# Patient Record
Sex: FEMALE | Race: WHITE | NOT HISPANIC OR LATINO | Employment: FULL TIME | URBAN - METROPOLITAN AREA
[De-identification: names, ages, dates, MRNs, and addresses within clinical notes are randomized per-mention and may not be internally consistent; named-entity substitution may affect disease eponyms.]

---

## 2017-12-11 ENCOUNTER — APPOINTMENT (OUTPATIENT)
Dept: WOMENS IMAGING | Facility: HOSPITAL | Age: 42
End: 2017-12-11

## 2017-12-11 PROCEDURE — 77067 SCR MAMMO BI INCL CAD: CPT | Performed by: RADIOLOGY

## 2017-12-28 ENCOUNTER — APPOINTMENT (OUTPATIENT)
Dept: WOMENS IMAGING | Facility: HOSPITAL | Age: 42
End: 2017-12-28

## 2017-12-28 PROCEDURE — 76641 ULTRASOUND BREAST COMPLETE: CPT | Performed by: RADIOLOGY

## 2017-12-28 PROCEDURE — G0204 DX MAMMO INCL CAD BI: HCPCS | Performed by: RADIOLOGY

## 2017-12-28 PROCEDURE — 77066 DX MAMMO INCL CAD BI: CPT | Performed by: RADIOLOGY

## 2019-01-02 ENCOUNTER — APPOINTMENT (OUTPATIENT)
Dept: WOMENS IMAGING | Facility: HOSPITAL | Age: 44
End: 2019-01-02

## 2019-01-02 PROCEDURE — 77067 SCR MAMMO BI INCL CAD: CPT | Performed by: RADIOLOGY

## 2019-01-02 PROCEDURE — 77063 BREAST TOMOSYNTHESIS BI: CPT | Performed by: RADIOLOGY

## 2020-01-13 ENCOUNTER — APPOINTMENT (OUTPATIENT)
Dept: WOMENS IMAGING | Facility: HOSPITAL | Age: 45
End: 2020-01-13

## 2020-01-13 PROCEDURE — 77067 SCR MAMMO BI INCL CAD: CPT | Performed by: RADIOLOGY

## 2020-01-13 PROCEDURE — 77063 BREAST TOMOSYNTHESIS BI: CPT | Performed by: RADIOLOGY

## 2020-07-28 ENCOUNTER — OFFICE VISIT (OUTPATIENT)
Dept: ORTHOPEDIC SURGERY | Facility: CLINIC | Age: 45
End: 2020-07-28

## 2020-07-28 VITALS
WEIGHT: 143 LBS | DIASTOLIC BLOOD PRESSURE: 68 MMHG | HEIGHT: 70 IN | SYSTOLIC BLOOD PRESSURE: 110 MMHG | HEART RATE: 71 BPM | BODY MASS INDEX: 20.47 KG/M2

## 2020-07-28 DIAGNOSIS — R52 PAIN: Primary | ICD-10-CM

## 2020-07-28 DIAGNOSIS — M67.432 GANGLION CYST OF VOLAR ASPECT OF LEFT WRIST: ICD-10-CM

## 2020-07-28 PROCEDURE — 20605 DRAIN/INJ JOINT/BURSA W/O US: CPT | Performed by: ORTHOPAEDIC SURGERY

## 2020-07-28 PROCEDURE — 73110 X-RAY EXAM OF WRIST: CPT | Performed by: ORTHOPAEDIC SURGERY

## 2020-07-28 PROCEDURE — 99203 OFFICE O/P NEW LOW 30 MIN: CPT | Performed by: ORTHOPAEDIC SURGERY

## 2020-07-28 RX ORDER — TRIAMCINOLONE ACETONIDE 40 MG/ML
40 INJECTION, SUSPENSION INTRA-ARTICULAR; INTRAMUSCULAR
Status: COMPLETED | OUTPATIENT
Start: 2020-07-28 | End: 2020-07-28

## 2020-07-28 RX ORDER — DILTIAZEM HYDROCHLORIDE 180 MG/1
180 CAPSULE, COATED, EXTENDED RELEASE ORAL DAILY
COMMUNITY

## 2020-07-28 RX ORDER — LIDOCAINE HYDROCHLORIDE 10 MG/ML
1 INJECTION, SOLUTION EPIDURAL; INFILTRATION; INTRACAUDAL; PERINEURAL
Status: COMPLETED | OUTPATIENT
Start: 2020-07-28 | End: 2020-07-28

## 2020-07-28 RX ORDER — TRAZODONE HYDROCHLORIDE 50 MG/1
50 TABLET ORAL
COMMUNITY
End: 2020-11-12 | Stop reason: DRUGHIGH

## 2020-07-28 RX ADMIN — LIDOCAINE HYDROCHLORIDE 1 ML: 10 INJECTION, SOLUTION EPIDURAL; INFILTRATION; INTRACAUDAL; PERINEURAL at 10:15

## 2020-07-28 RX ADMIN — TRIAMCINOLONE ACETONIDE 40 MG: 40 INJECTION, SUSPENSION INTRA-ARTICULAR; INTRAMUSCULAR at 10:15

## 2020-07-28 NOTE — PROGRESS NOTES
Subjective:     Patient ID: Colette Moreno is a 45 y.o. female.    Chief Complaint:  Left wrist pain and swelling  History of Present Illness  Colette Moreno presents to clinic today for evaluation of left wrist swelling and pain, area of swelling has been noted since approximately December, no injury prior to the onset of her symptoms.  Rates her associated pain is a 3 out of 10, exacerbated with local pressure or when typing and putting pressure through the volar aspect of her wrist, she does note some intermittent tingling shooting down into her hand with associated sharp pain with pressure or contact.  Minimal improvement with soft tissue massage and rest.  She is right-hand dominant.  Denies radiation of her pain but does note some radiating tingling as noted above.     Social History     Occupational History   • Not on file   Tobacco Use   • Smoking status: Never Smoker   • Smokeless tobacco: Never Used   Substance and Sexual Activity   • Alcohol use: Yes   • Drug use: Never   • Sexual activity: Defer      Past Medical History:   Diagnosis Date   • Cardiac arrhythmia due to congenital heart disease    • Fracture of wrist     R   • Insomnia    • Tear of meniscus of knee      Past Surgical History:   Procedure Laterality Date   • CHOLECYSTECTOMY  12/2016   • KNEE SURGERY Left 12/2007       Family History   Problem Relation Age of Onset   • Cancer Maternal Grandfather    • Hypertension Father    • Hypertension Brother    • Hypertension Paternal Aunt    • Heart disease Paternal Aunt    • Hypertension Paternal Uncle    • Heart disease Paternal Uncle    • Hypertension Paternal Grandmother    • Heart disease Paternal Grandmother    • Hypertension Paternal Grandfather    • Heart disease Paternal Grandfather          Review of Systems   Constitutional: Negative for chills, diaphoresis, fever and unexpected weight change.   HENT: Negative for hearing loss, nosebleeds, sore throat and tinnitus.    Eyes:  "Negative for pain and visual disturbance.   Respiratory: Negative for cough, shortness of breath and wheezing.    Cardiovascular: Positive for palpitations. Negative for chest pain.   Gastrointestinal: Negative for abdominal pain, diarrhea, nausea and vomiting.   Endocrine: Negative for cold intolerance, heat intolerance and polydipsia.   Genitourinary: Negative for difficulty urinating, dysuria and hematuria.   Musculoskeletal: Positive for joint swelling. Negative for arthralgias and myalgias.   Skin: Negative for rash and wound.   Allergic/Immunologic: Positive for environmental allergies.   Neurological: Negative for dizziness, syncope and numbness.   Hematological: Bruises/bleeds easily.   Psychiatric/Behavioral: Positive for sleep disturbance. Negative for dysphoric mood. The patient is not nervous/anxious.            Objective:  Vitals:    07/28/20 0922   BP: 110/68   BP Location: Right arm   Pulse: 71   Weight: 64.9 kg (143 lb)   Height: 177.8 cm (70\")         07/28/20 0922   Weight: 64.9 kg (143 lb)     Body mass index is 20.52 kg/m².  Physical Exam    Vital signs reviewed.   General: No acute distress, alert and oriented  Eyes: conjunctiva clear; pupils equally round and reactive  ENT: external ears and nose atraumatic; oropharynx clear  CV: no peripheral edema  Resp: normal respiratory effort  Skin: no rashes or wounds; normal turgor  Psych: mood and affect appropriate; recent and remote memory intact          Ortho Exam     Left wrist-small 2 cm volar mass adjacent to FCR tendon moderate tenderness with deep palpation noted, it is mobile, firm, no overlying skin changes.  Full flexion-extension left wrist symmetric to the right with 5-5 strength, negative Gates shift test, no tenderness over anatomic snuffbox.  Full flexion extension all digits of left hand at MCP and IP joints with 5-5 strength, positive thumbs up, okay sign, cross finger abduction and adduction test against resistance.  Positive " sensation light touch all distributions left hand symmetric to right, brisk cap refill all digits, 2+ radial pulse left wrist.    Imaging:  Left Wrist X-Ray  Indication: Pain  AP, Lateral, and Oblique views    Findings:  No fracture  No bony lesion  Normal soft tissues  Normal joint spaces    No prior studies were available for comparison.    Assessment:        1. Pain    2. Ganglion cyst of volar aspect of left wrist           Plan:  Medium Joint Arthrocentesis  Date/Time: 7/28/2020 10:15 AM  Consent given by: patient  Site marked: site marked  Timeout: Immediately prior to procedure a time out was called to verify the correct patient, procedure, equipment, support staff and site/side marked as required   Supporting Documentation  Indications: pain   Procedure Details  Location: wrist - Wrist joint: LEFT WRIST.  Preparation: Patient was prepped and draped in the usual sterile fashion  Needle gauge: 20G ASPIRATION 22G INJECTION.  Approach: volar  Medications administered: 1 mL lidocaine PF 1% 1 %; 40 mg triamcinolone acetonide 40 MG/ML  Aspirate amount: 1 mL  Aspirate characteristics: JELLY-CYST ASPIRATION.  Patient tolerance: patient tolerated the procedure well with no immediate complications                1. Discussed treatment options at length with patient at today's visit.  Patient wished to proceed with aspiration and injection of the left wrist ganglion cyst.  Also recommended immobilization with a brace.  Follow-up in 4 weeks and if still having issues at that time may consider excision versus referral to hand surgeon.      Colettena Moreno was in agreement with plan and had all questions answered.     Orders:  Orders Placed This Encounter   Procedures   • Medium Joint Arthrocentesis   • XR Wrist 3+ View Left       Medications:  No orders of the defined types were placed in this encounter.      Followup:  Return in about 4 weeks (around 8/25/2020).    Colette was seen today for pain.    Diagnoses and all  orders for this visit:    Pain  -     XR Wrist 3+ View Left    Ganglion cyst of volar aspect of left wrist    Other orders  -     Medium Joint Arthrocentesis          Dictated utilizing Dragon dictation

## 2020-11-10 NOTE — PROGRESS NOTES
Subjective:     Patient ID: Colette Moreno is a 45 y.o. female.    Chief Complaint: left knee pain, new issue  Follow-up left wrist volar ganglion cyst  Last injection left wrist 07/28/2020    History of Present Illness  Colette Moreno presents to clinic today for evaluation of her left wrist and left knee. She states that about two weeks ago, the ganglion cyst began to reoccur after it had previously improved with injection. She has had increased pain and swelling in the wrist since that time, as well as intermittent numbness and tingling. She rates the pain as 3/10, describes it as aching in nature. Localizes pain to thenar eminence of the left hand. Has noted improvement with wearing a brace. Symptoms are exacerbated with gripping and lifting. Denies radiation of pain.    Patient also complains of left knee pain today. She states that she had a meniscal tear about 13 years ago, which was repaired. Her pain began to reoccur about 2 months ago, with no new injury at that time. She also notes weakness and some swelling, and occasional catching in the knee. She rates the pain as 4/10, describes it as aching, sharp, and shooting in nature. Localizes pain to the medial aspect of the knee.  Has noted improvement with activity modification. Symptoms are exacerbated with rotation and flexion. Denies radiation of pain, denies associated numbness or tingling.         Social History     Occupational History   • Not on file   Tobacco Use   • Smoking status: Never Smoker   • Smokeless tobacco: Never Used   Substance and Sexual Activity   • Alcohol use: Yes   • Drug use: Never   • Sexual activity: Defer      Past Medical History:   Diagnosis Date   • Cardiac arrhythmia due to congenital heart disease    • Fracture of wrist     R   • Insomnia    • Tear of meniscus of knee      Past Surgical History:   Procedure Laterality Date   • CHOLECYSTECTOMY  12/2016   • KNEE SURGERY Left 12/2007       Family History   Problem  "Relation Age of Onset   • Cancer Maternal Grandfather    • Hypertension Father    • Hypertension Brother    • Hypertension Paternal Aunt    • Heart disease Paternal Aunt    • Hypertension Paternal Uncle    • Heart disease Paternal Uncle    • Hypertension Paternal Grandmother    • Heart disease Paternal Grandmother    • Hypertension Paternal Grandfather    • Heart disease Paternal Grandfather          Review of Systems   Constitutional: Negative for chills, diaphoresis and unexpected weight change.   HENT: Negative for hearing loss, nosebleeds, sore throat and tinnitus.    Eyes: Negative for pain and visual disturbance.   Respiratory: Negative for cough, shortness of breath and wheezing.    Cardiovascular: Negative for chest pain and palpitations.   Gastrointestinal: Negative for abdominal pain, diarrhea, nausea and vomiting.   Endocrine: Negative for cold intolerance, heat intolerance and polydipsia.   Genitourinary: Negative for difficulty urinating, dyspareunia and hematuria.   Musculoskeletal: Positive for arthralgias and myalgias.   Skin: Negative for rash and wound.   Allergic/Immunologic: Negative for environmental allergies.   Neurological: Negative for dizziness, syncope and numbness.   Hematological: Does not bruise/bleed easily.   Psychiatric/Behavioral: Negative for dysphoric mood and sleep disturbance. The patient is not nervous/anxious.            Objective:  Vitals:    11/12/20 0849   BP: 105/71   Pulse: 61   Weight: 64.9 kg (143 lb)   Height: 177.8 cm (70\")         11/12/20  0849   Weight: 64.9 kg (143 lb)     Body mass index is 20.52 kg/m².    Physical Exam    Vital signs reviewed.   General: No acute distress, alert and oriented  Eyes: conjunctiva clear; pupils equally round and reactive  ENT: external ears and nose atraumatic; oropharynx clear  CV: no peripheral edema  Resp: normal respiratory effort  Skin: no rashes or wounds; normal turgor  Psych: mood and affect appropriate; recent and remote " memory intact        Ortho Exam       Left Knee-    ROM 0-135 degrees  4+/5 on flexion  4+/5 on extension  Positive Emigdio exam medial joint line    Grade 1A [place]  Anterior drawer- negative  Posterior drawer- negative   No opening on varus and valgus stress at 0 and 30    Log roll-  negative  Stinchfield-  negative    Positive sensation light tough all distributions symmetric to contralateral side  Brisk cap refill all digits  2+ dorsalis pedis pulse    Left Wrist-  Moderate tenderness over volar wrist   Pain on resisted wrist flexion    Minimal swelling over previous volar cyst   Maximal pain with thumb flexion over FPL tendon  Supination and pronation - 90 degrees, 4+/5 strength  Full radial and ulnar deviation  Mildly positive basilar thumb grind test  Positive sensation anatomic snuffbox   Positive sensation light touch all distributions  Brisk cap refill, 2+ radial pulse        Imaging:  Left Knee X-Ray  Indication: Pain    AP, Lateral, and [place] views    Findings:  No fracture  No bony lesion  Normal soft tissues  Mild medial compartment joint space narrowing    No prior studies were available for comparison.    Assessment:        1. Left knee pain, unspecified chronicity    2. Ganglion cyst of volar aspect of left wrist    3. Mechanical knee pain, left           Plan:          1. Discussed treatment options at length with patient at today's visit including referral to hand surgery vs occupational therapy vs oral steroid or antiinflammatory and bracing for wrist, and MRI vs cortisone injection for the knee. Patient would like to proceed with antiinflammatory and MRI.   2. Prescribed Meloxicam 7.5 mg daily for inflammation  3. Provided samples of Pennsaid today.  4. Continue to wear wrist brace for activities as needed.  5. Ordered MRI of the left knee to evaluate for meniscal pathology.  6. Follow-up with me for review of MRI results. Will consider referral to hand surgery regarding left wrist if  symptoms have not improved.       Colette DAPHNIE Moreno was in agreement with plan and had all questions answered.     Orders:  Orders Placed This Encounter   Procedures   • XR Knee 3+ View With [place] Left       Medications:  New Medications Ordered This Visit   Medications   • meloxicam (MOBIC) 7.5 MG tablet     Sig: Take 1 tablet by mouth Daily.     Dispense:  30 tablet     Refill:  0       Followup:  Return for review of MRI results.    Diagnoses and all orders for this visit:    1. Left knee pain, unspecified chronicity (Primary)  -     XR Knee 3+ View With [place] Left    2. Ganglion cyst of volar aspect of left wrist    3. Mechanical knee pain, left    Other orders  -     meloxicam (MOBIC) 7.5 MG tablet; Take 1 tablet by mouth Daily.  Dispense: 30 tablet; Refill: 0           By signing my name here, I Juanis Escobar attest that all documentation on 11/12/20 at 09:40 EST has been prepared under the direction and in the presence of Dr. Jung Arthur MD.    I, Dr. Jung Arthur, personally performed the services described in this documentation, as scribed by Juanis Escobar, in my presence, and it is both accurate and complete.        Dictated utilizing Dragon dictation

## 2020-11-12 ENCOUNTER — OFFICE VISIT (OUTPATIENT)
Dept: ORTHOPEDIC SURGERY | Facility: CLINIC | Age: 45
End: 2020-11-12

## 2020-11-12 VITALS
DIASTOLIC BLOOD PRESSURE: 71 MMHG | SYSTOLIC BLOOD PRESSURE: 105 MMHG | WEIGHT: 143 LBS | HEIGHT: 70 IN | BODY MASS INDEX: 20.47 KG/M2 | HEART RATE: 61 BPM

## 2020-11-12 DIAGNOSIS — M67.432 GANGLION CYST OF VOLAR ASPECT OF LEFT WRIST: ICD-10-CM

## 2020-11-12 DIAGNOSIS — M25.562 LEFT KNEE PAIN, UNSPECIFIED CHRONICITY: Primary | ICD-10-CM

## 2020-11-12 DIAGNOSIS — M18.12 PRIMARY OSTEOARTHRITIS OF FIRST CARPOMETACARPAL JOINT OF LEFT HAND: ICD-10-CM

## 2020-11-12 DIAGNOSIS — M25.562 MECHANICAL KNEE PAIN, LEFT: ICD-10-CM

## 2020-11-12 PROCEDURE — 73562 X-RAY EXAM OF KNEE 3: CPT | Performed by: ORTHOPAEDIC SURGERY

## 2020-11-12 PROCEDURE — 99214 OFFICE O/P EST MOD 30 MIN: CPT | Performed by: ORTHOPAEDIC SURGERY

## 2020-11-12 RX ORDER — TRAZODONE HYDROCHLORIDE 100 MG/1
TABLET ORAL
COMMUNITY
Start: 2020-10-27

## 2020-11-12 RX ORDER — MELOXICAM 7.5 MG/1
7.5 TABLET ORAL DAILY
Qty: 30 TABLET | Refills: 0 | Status: SHIPPED | OUTPATIENT
Start: 2020-11-12 | End: 2023-02-13

## 2020-11-27 ENCOUNTER — HOSPITAL ENCOUNTER (OUTPATIENT)
Dept: MRI IMAGING | Facility: HOSPITAL | Age: 45
Discharge: HOME | End: 2020-11-27
Admitting: ORTHOPAEDIC SURGERY

## 2020-11-27 DIAGNOSIS — M25.562 MECHANICAL KNEE PAIN, LEFT: ICD-10-CM

## 2020-11-27 DIAGNOSIS — M25.562 LEFT KNEE PAIN, UNSPECIFIED CHRONICITY: ICD-10-CM

## 2020-11-27 PROCEDURE — 73721 MRI JNT OF LWR EXTRE W/O DYE: CPT

## 2020-11-30 ENCOUNTER — TELEPHONE (OUTPATIENT)
Dept: ORTHOPEDIC SURGERY | Facility: CLINIC | Age: 45
End: 2020-11-30

## 2020-11-30 NOTE — TELEPHONE ENCOUNTER
FYI: PATIENT CALLED BACK & WAS SCHEDULED TO SEE DR. PRINCE 1ST AVAILABLE 12/22/20 @0920 FOR FOLLOW UP / LEFT KNEE / MRI 11/27/20 ([facility]) TO DISCUSS RESULTS, OPTIONS.     PATIENT MOVING & UNAVAILABLE TILL 12/10/20. PLACED ON WAIT LIST & NEEDS 1 HR'S NOTICE TO TRY & RESCHEDULE IF POSSIBLE.     CALL BACK [phone]     THANKS

## 2020-11-30 NOTE — TELEPHONE ENCOUNTER
----- Message from Jung Arthur MD sent at 11/30/2020 11:42 AM EST -----  Please schedule patient for follow up to review study results

## 2020-11-30 NOTE — TELEPHONE ENCOUNTER
Patient to call and make appointment when she has her appointment book in hand. Needs follow up appointment for MRI results with dr. Arthur.

## 2020-12-08 ENCOUNTER — TELEPHONE (OUTPATIENT)
Dept: ORTHOPEDIC SURGERY | Facility: CLINIC | Age: 45
End: 2020-12-08

## 2020-12-08 NOTE — TELEPHONE ENCOUNTER
Apt  Is set for12/22, wanted you to know her knee now has completely given out on her.  She cant bear wt.on it, cant go up or down steps, cant walk on uneven surfaces.  It is very very painful, swollen and really cant squat or bend.  She feels this is very serious and cant walk and get a round and wants to be seen sooner than 12/22

## 2020-12-11 ENCOUNTER — OFFICE VISIT (OUTPATIENT)
Dept: ORTHOPEDIC SURGERY | Facility: CLINIC | Age: 45
End: 2020-12-11

## 2020-12-11 VITALS — WEIGHT: 143 LBS | BODY MASS INDEX: 20.47 KG/M2 | HEIGHT: 70 IN

## 2020-12-11 DIAGNOSIS — M17.12 PRIMARY OSTEOARTHRITIS OF LEFT KNEE: Primary | ICD-10-CM

## 2020-12-11 DIAGNOSIS — M23.204 OLD COMPLEX TEAR OF MEDIAL MENISCUS OF LEFT KNEE: ICD-10-CM

## 2020-12-11 PROBLEM — G43.909 MIGRAINE HEADACHE: Status: ACTIVE | Noted: 2020-12-11

## 2020-12-11 PROBLEM — Z86.79 H/O SINUS TACHYCARDIA: Status: ACTIVE | Noted: 2020-12-11

## 2020-12-11 PROBLEM — G45.0 VERTEBROBASILAR ARTERY INSUFFICIENCY: Status: ACTIVE | Noted: 2018-08-10

## 2020-12-11 PROCEDURE — 99214 OFFICE O/P EST MOD 30 MIN: CPT | Performed by: ORTHOPAEDIC SURGERY

## 2020-12-11 PROCEDURE — 20610 DRAIN/INJ JOINT/BURSA W/O US: CPT | Performed by: ORTHOPAEDIC SURGERY

## 2020-12-11 RX ORDER — TRIAMCINOLONE ACETONIDE 40 MG/ML
80 INJECTION, SUSPENSION INTRA-ARTICULAR; INTRAMUSCULAR
Status: COMPLETED | OUTPATIENT
Start: 2020-12-11 | End: 2020-12-11

## 2020-12-11 RX ORDER — LIDOCAINE HYDROCHLORIDE 10 MG/ML
8 INJECTION, SOLUTION EPIDURAL; INFILTRATION; INTRACAUDAL; PERINEURAL
Status: COMPLETED | OUTPATIENT
Start: 2020-12-11 | End: 2020-12-11

## 2020-12-11 RX ORDER — DILTIAZEM HYDROCHLORIDE 180 MG/1
CAPSULE, EXTENDED RELEASE ORAL
COMMUNITY
Start: 2020-10-14

## 2020-12-11 RX ADMIN — TRIAMCINOLONE ACETONIDE 80 MG: 40 INJECTION, SUSPENSION INTRA-ARTICULAR; INTRAMUSCULAR at 08:31

## 2020-12-11 RX ADMIN — LIDOCAINE HYDROCHLORIDE 8 ML: 10 INJECTION, SOLUTION EPIDURAL; INFILTRATION; INTRACAUDAL; PERINEURAL at 08:31

## 2020-12-11 NOTE — PROGRESS NOTES
Subjective:     Patient ID: Colette Moreno is a 45 y.o. female.    Chief Complaint: follow-up left knee pain  MRI results    History of Present Illness  Colette Moreno returns to clinic today for evaluation of her left knee. She complains of continued pain and stiffness in the left knee today, as well as frequent popping. She states that the pain has improved over the past two days compared to previously, but the stiffness is unchanged. She also notes an incident in which her knee gave way 2 days ago. She rates the pain as 4/10, describes it as aching, sharp, and shooting in nature. Localizes pain to the medial aspect of the knee, with radiation into the leg. Has noted improvement with activity modification. Symptoms are exacerbated with walking up or down inclines or stairs. Denies associated numbness or tingling.       Social History     Occupational History   • Not on file   Tobacco Use   • Smoking status: Never Smoker   • Smokeless tobacco: Never Used   Substance and Sexual Activity   • Alcohol use: Yes   • Drug use: Never   • Sexual activity: Defer      Past Medical History:   Diagnosis Date   • Cardiac arrhythmia due to congenital heart disease    • Fracture of wrist     R   • Insomnia    • Tear of meniscus of knee      Past Surgical History:   Procedure Laterality Date   • CHOLECYSTECTOMY  12/2016   • KNEE SURGERY Left 12/2007       Family History   Problem Relation Age of Onset   • Cancer Maternal Grandfather    • Hypertension Father    • Hypertension Brother    • Hypertension Paternal Aunt    • Heart disease Paternal Aunt    • Hypertension Paternal Uncle    • Heart disease Paternal Uncle    • Hypertension Paternal Grandmother    • Heart disease Paternal Grandmother    • Hypertension Paternal Grandfather    • Heart disease Paternal Grandfather          Review of Systems   Constitutional: Negative for chills, diaphoresis and unexpected weight change.   HENT: Negative for hearing loss, nosebleeds,  "sore throat and tinnitus.    Eyes: Negative for pain and visual disturbance.   Respiratory: Negative for cough, shortness of breath and wheezing.    Cardiovascular: Negative for chest pain and palpitations.   Gastrointestinal: Negative for abdominal pain, diarrhea, nausea and vomiting.   Endocrine: Negative for cold intolerance, heat intolerance and polydipsia.   Genitourinary: Negative for difficulty urinating, dyspareunia and hematuria.   Musculoskeletal: Positive for arthralgias and myalgias.   Skin: Negative for rash and wound.   Allergic/Immunologic: Negative for environmental allergies.   Neurological: Negative for dizziness, syncope and numbness.   Hematological: Does not bruise/bleed easily.   Psychiatric/Behavioral: Negative for dysphoric mood and sleep disturbance. The patient is not nervous/anxious.            Objective:  Vitals:    12/11/20 0750   Weight: 64.9 kg (143 lb)   Height: 177.8 cm (70\")         12/11/20 0750   Weight: 64.9 kg (143 lb)     Body mass index is 20.52 kg/m².     Physical Exam    Vital signs reviewed.   General: No acute distress, alert and oriented  Eyes: conjunctiva clear; pupils equally round and reactive  ENT: external ears and nose atraumatic; oropharynx clear  CV: no peripheral edema  Resp: normal respiratory effort  Skin: no rashes or wounds; normal turgor  Psych: mood and affect appropriate; recent and remote memory intact        Ortho Exam       Left Knee-     ROM 0-130 degrees  4+/5 on flexion  4+/5 on extension  Maximal tenderness medial joint line with positive Emigdio exam  Moderate effusion  No opening on varus and valgus stress at 0 and 30     Log roll-  negative  Stinchfield-  negative  Positive active patella compression test  Positive sensation light tough all distributions symmetric to contralateral side  Brisk cap refill all digits  2+ dorsalis pedis pulse      Imaging:  Mri Knee Left Without Contrast    Result Date: 11/30/2020  Impression: Moderately advanced " medial compartment arthrosis with diffuse full-thickness tear near full-thickness cartilage loss medial femoral condyle and medial tibial plateau as detailed above. No significant subchondral edema. Small amount of subchondral cystic change medial tibial plateau. Mild chondromalacia patella Meniscal volume loss and signal abnormality mid body segment medial meniscus may represent a degenerative longitudinal oblique tear or sequela of partial meniscectomy. Mild medial meniscal extrusion. No sizable tear or displaced meniscal fragment. Small knee effusion. 5 mm structure anterior to the tibial insertion of the ACL may represent focal synovitis (favored) or potentially loose body. Signer Name: KATARINA Cristobal MD  Signed: 11/30/2020 9:05 AM  Workstation Name: [id number]  [facility]    Review of outside MRI left knee including review of image as well as radiology report indicates moderately advanced medial compartment osteoarthritis with meniscal volume loss the medial meniscus consistent with prior partial meniscectomy and longitudinal oblique tear with mild extrusion, no evidence of displaced fragment.  Potential loose body anterior to the tibial insertion of the ACL 5 mm in size.    Assessment:        1. Primary osteoarthritis of left knee    2. Old complex tear of medial meniscus of left knee           Plan:  Large Joint Arthrocentesis: L knee  Date/Time: 12/11/2020 8:31 AM  Consent given by: patient  Site marked: site marked  Timeout: Immediately prior to procedure a time out was called to verify the correct patient, procedure, equipment, support staff and site/side marked as required   Supporting Documentation  Indications: pain   Procedure Details  Location: knee - L knee  Preparation: Patient was prepped and draped in the usual sterile fashion  Needle size: 22 G  Approach: superior (LATERAL)  Medications administered: 8 mL lidocaine PF 1% 1 %; 80 mg triamcinolone acetonide 40  MG/ML  Patient tolerance: patient tolerated the procedure well with no immediate complications                1. Discussed treatment options at length with patient at today's visit, including home-exercises, bracing, supplements, antiinflammatories, and cortisone or viscosupplement injections.  2. Patient would like to proceed with cortisone injection today to the left knee. Recommended limited use of affected extremity for the next 24 hours to only essential activites other than work on general active and passive motion. Recommended supplementing with ice and soft tissue massage. Discussed with patient that they should see results in 5-7 days, if no improvement in 5-6 weeks I have asked them to call the office to review other options. Patient should call office immediately if they notice redness, warmth, fevers, chills, or residual numbness or tingling for greater than 6 hours after injection.   3. Continue to take glucosamine and chondroitin supplement if it provides relief. May also add tumeric.  4. Follow-up with me in 3 months for evaluation of improvement with injection   5. Hip, core, quad exercises given today.      Colette Moreno was in agreement with plan and had all questions answered.     Orders:  Orders Placed This Encounter   Procedures   • Large Joint Arthrocentesis: L knee       Medications:  No orders of the defined types were placed in this encounter.      Followup:  Return in about 3 months (around 3/11/2021).    Diagnoses and all orders for this visit:    1. Primary osteoarthritis of left knee (Primary)    2. Old complex tear of medial meniscus of left knee    Other orders  -     Large Joint Arthrocentesis: L knee           By signing my name here, I Juanis Escobar attest that all documentation on 12/11/20 at 09:04 EST has been prepared under the direction and in the presence of Dr. Jung Arthur MD.    I, Dr. Jung Arthur, personally performed the services described in this  documentation, as scribed by Juanis Escobar, in my presence, and it is both accurate and complete.        Dictated utilizing Dragon dictation

## 2021-03-16 ENCOUNTER — OFFICE VISIT (OUTPATIENT)
Dept: ORTHOPEDIC SURGERY | Facility: CLINIC | Age: 46
End: 2021-03-16

## 2021-03-16 VITALS — HEIGHT: 70 IN | WEIGHT: 143 LBS | BODY MASS INDEX: 20.47 KG/M2

## 2021-03-16 DIAGNOSIS — M17.12 PRIMARY OSTEOARTHRITIS OF LEFT KNEE: Primary | ICD-10-CM

## 2021-03-16 DIAGNOSIS — M23.204 OLD COMPLEX TEAR OF MEDIAL MENISCUS OF LEFT KNEE: ICD-10-CM

## 2021-03-16 PROBLEM — G47.00 INSOMNIA: Status: ACTIVE | Noted: 2018-06-12

## 2021-03-16 PROBLEM — I48.92 ATRIAL FLUTTER (HCC): Status: ACTIVE | Noted: 2018-11-09

## 2021-03-16 PROBLEM — M67.432 GANGLION CYST OF VOLAR ASPECT OF LEFT WRIST: Status: ACTIVE | Noted: 2020-07-28

## 2021-03-16 PROBLEM — E80.6 HYPERBILIRUBINEMIA: Status: ACTIVE | Noted: 2020-07-10

## 2021-03-16 PROBLEM — D84.9 IMMUNODEFICIENCY DISORDER (HCC): Status: ACTIVE | Noted: 2018-06-12

## 2021-03-16 PROBLEM — Z98.890 HISTORY OF CARDIOVASCULAR SURGERY: Status: ACTIVE | Noted: 2018-11-09

## 2021-03-16 PROBLEM — R10.32 LEFT LOWER QUADRANT PAIN: Status: ACTIVE | Noted: 2020-07-13

## 2021-03-16 PROBLEM — G43.109 COMPLICATED MIGRAINE: Status: ACTIVE | Noted: 2019-10-17

## 2021-03-16 PROBLEM — R94.31 SHORTENED PR INTERVAL: Status: ACTIVE | Noted: 2018-06-12

## 2021-03-16 PROCEDURE — 99213 OFFICE O/P EST LOW 20 MIN: CPT | Performed by: ORTHOPAEDIC SURGERY

## 2021-03-16 NOTE — PROGRESS NOTES
Subjective:     Patient ID: Colette Moreno is a 46 y.o. female.    Chief Complaint:  follow-up left knee pain, DJD, old complex tear medial meniscus  Follow-up recent injection left knee 12/11/2020  History of Present Illness  Colette Moreno returns to clinic today for evaluation of left knee, she did get good improvement with injection from last visit with approximately 80% release for pain but has noted some return of pain as well as residual pain primarily on the medial joint line exacerbated with any lateral or rotational activities rates as a 7-8 out of 10 and sharp in nature when it does occur.  She has had one locking episode since time of last visit.  Mild intermittent swelling that does wax and wane.  Denies radiation, denies associated numbness or tingling.     Social History     Occupational History   • Not on file   Tobacco Use   • Smoking status: Never Smoker   • Smokeless tobacco: Never Used   Vaping Use   • Vaping Use: Never used   Substance and Sexual Activity   • Alcohol use: Yes   • Drug use: Never   • Sexual activity: Defer      Past Medical History:   Diagnosis Date   • Cardiac arrhythmia due to congenital heart disease    • Fracture of wrist     R   • Insomnia    • Tear of meniscus of knee      Past Surgical History:   Procedure Laterality Date   • CHOLECYSTECTOMY  12/2016   • KNEE SURGERY Left 12/2007       Family History   Problem Relation Age of Onset   • Cancer Maternal Grandfather    • Hypertension Father    • Hypertension Brother    • Hypertension Paternal Aunt    • Heart disease Paternal Aunt    • Hypertension Paternal Uncle    • Heart disease Paternal Uncle    • Hypertension Paternal Grandmother    • Heart disease Paternal Grandmother    • Hypertension Paternal Grandfather    • Heart disease Paternal Grandfather          Review of Systems   Constitutional: Negative for chills, diaphoresis, fever and unexpected weight change.   HENT: Negative for hearing loss, nosebleeds, sore  "throat and tinnitus.    Eyes: Negative for pain and visual disturbance.   Respiratory: Negative for cough, shortness of breath and wheezing.    Cardiovascular: Negative for chest pain and palpitations.   Gastrointestinal: Negative for abdominal pain, diarrhea, nausea and vomiting.   Endocrine: Negative for cold intolerance, heat intolerance and polydipsia.   Genitourinary: Negative for difficulty urinating, dysuria and hematuria.   Musculoskeletal: Positive for arthralgias and myalgias. Negative for joint swelling.   Skin: Negative for rash and wound.   Allergic/Immunologic: Negative for environmental allergies.   Neurological: Negative for dizziness, syncope and numbness.   Hematological: Does not bruise/bleed easily.   Psychiatric/Behavioral: Negative for dysphoric mood and sleep disturbance. The patient is not nervous/anxious.            Objective:  Vitals:    03/16/21 1557   Weight: 64.9 kg (143 lb)   Height: 177.8 cm (70\")         03/16/21  1557   Weight: 64.9 kg (143 lb)     Body mass index is 20.52 kg/m².  General: No acute distress.  Resp: normal respiratory effort  Skin: no rashes or wounds; normal turgor  Psych: mood and affect appropriate; recent and remote memory intact          Ortho Exam     Left knee-range of motion 0 135 degrees, 4+ out of 5 strength on flexion and extension, moderate residual tenderness along medial joint line with positive Emigdio exam, stable to varus and valgus stress at 0 and 30 degrees, mild effusion noted.  Positive active patella compression test, brisk cap refill all digits, grade 1A Lachman, negative anterior posterior drawer.    Imaging:  none  Assessment:        1. Primary osteoarthritis of left knee    2. Old complex tear of medial meniscus of left knee           Plan:          1. Discussed treatment options at length with patient at today's visit.  Given residual pain as well as failure of long-term improvement with cortisone injection, anti-inflammatory medication, " and continued pain despite home exercise program with good range of motion and strength of her knee, we discussed options the patient wished to proceed with Visco supplement injection to the left knee.  We will submit for insurance authorization in this regard.  Recommended continue conservative treatment with glucosamine and chondroitin as well as home exercise program and knee sleeve intermittently.      Colette Moreno was in agreement with plan and had all questions answered.     Orders:  Orders Placed This Encounter   Procedures   • Visco Treatment       Medications:  No orders of the defined types were placed in this encounter.      Followup:  Return for viscosupplement injections.    Diagnoses and all orders for this visit:    1. Primary osteoarthritis of left knee (Primary)  -     Visco Treatment; Future    2. Old complex tear of medial meniscus of left knee  -     Visco Treatment; Future          Dictated utilizing Dragon dictation

## 2021-05-04 ENCOUNTER — CLINICAL SUPPORT (OUTPATIENT)
Dept: ORTHOPEDIC SURGERY | Facility: CLINIC | Age: 46
End: 2021-05-04

## 2021-05-04 DIAGNOSIS — M23.204 OLD COMPLEX TEAR OF MEDIAL MENISCUS OF LEFT KNEE: ICD-10-CM

## 2021-05-04 DIAGNOSIS — M17.12 PRIMARY OSTEOARTHRITIS OF LEFT KNEE: Primary | ICD-10-CM

## 2021-05-04 PROCEDURE — 20610 DRAIN/INJ JOINT/BURSA W/O US: CPT | Performed by: ORTHOPAEDIC SURGERY

## 2021-05-04 RX ORDER — NITROFURANTOIN 25; 75 MG/1; MG/1
CAPSULE ORAL
COMMUNITY
Start: 2021-03-28

## 2021-05-04 RX ORDER — DICLOFENAC POTASSIUM 50 MG/1
TABLET, FILM COATED ORAL
COMMUNITY
Start: 2021-03-16

## 2021-05-04 NOTE — PROGRESS NOTES
Subjective:     Patient ID: Colette Moreno is a 46 y.o. female.    Chief Complaint:    History of Present Illness  Colette Moreno presents to clinic today for left knee injection    Social History     Occupational History   • Not on file   Tobacco Use   • Smoking status: Never Smoker   • Smokeless tobacco: Never Used   Vaping Use   • Vaping Use: Never used   Substance and Sexual Activity   • Alcohol use: Yes   • Drug use: Never   • Sexual activity: Defer      Past Medical History:   Diagnosis Date   • Cardiac arrhythmia due to congenital heart disease    • Fracture of wrist     R   • Insomnia    • Tear of meniscus of knee      Past Surgical History:   Procedure Laterality Date   • CHOLECYSTECTOMY  12/2016   • KNEE SURGERY Left 12/2007       Family History   Problem Relation Age of Onset   • Cancer Maternal Grandfather    • Hypertension Father    • Hypertension Brother    • Hypertension Paternal Aunt    • Heart disease Paternal Aunt    • Hypertension Paternal Uncle    • Heart disease Paternal Uncle    • Hypertension Paternal Grandmother    • Heart disease Paternal Grandmother    • Hypertension Paternal Grandfather    • Heart disease Paternal Grandfather          Review of Systems        Objective:  There were no vitals filed for this visit.  There were no vitals filed for this visit.  There is no height or weight on file to calculate BMI.        Ortho Exam       Imaging:    Assessment:        1. Primary osteoarthritis of left knee    2. Old complex tear of medial meniscus of left knee           Plan:      Large Joint Arthrocentesis  Date/Time: 5/4/2021 4:16 PM  Consent given by: patient  Site marked: site marked  Timeout: Immediately prior to procedure a time out was called to verify the correct patient, procedure, equipment, support staff and site/side marked as required   Supporting Documentation  Indications: pain   Procedure Details  Location: knee - Knee joint: left.  Preparation: Patient was prepped  and draped in the usual sterile fashion  Needle size: 22 G  Approach: superior  Medications administered: 60 mg Sodium Hyaluronate 60 MG/3ML  Patient tolerance: patient tolerated the procedure well with no immediate complications          1. Discussed treatment options at length with patient at today's visit.   2. Patient presents to clinic today for left knee viscosupplement one shot injection. I explained details of injections as well as risks, benefits and alternatives with the patient today, had all questions answered, wished to proceed with injection.  I will see patient back as needed for follow up on injection. Patient was instructed to watch for signs or symptoms of infection including redness, swelling, warmth to the touch, or significant increased pain and to contact our office immediately if any of these issues were noted.      Colette Moreno was in agreement with plan and had all questions answered.     Orders:  Orders Placed This Encounter   Procedures   • Large Joint Arthrocentesis       Medications:  No orders of the defined types were placed in this encounter.      Followup:  Return if symptoms worsen or fail to improve.    Diagnoses and all orders for this visit:    1. Primary osteoarthritis of left knee (Primary)    2. Old complex tear of medial meniscus of left knee    Other orders  -     Large Joint Arthrocentesis          Dictated utilizing Dragon dictation

## 2021-06-30 ENCOUNTER — APPOINTMENT (OUTPATIENT)
Dept: WOMENS IMAGING | Facility: HOSPITAL | Age: 46
End: 2021-06-30

## 2021-06-30 PROCEDURE — 77067 SCR MAMMO BI INCL CAD: CPT | Performed by: RADIOLOGY

## 2021-06-30 PROCEDURE — 77063 BREAST TOMOSYNTHESIS BI: CPT | Performed by: RADIOLOGY

## 2022-07-18 ENCOUNTER — APPOINTMENT (OUTPATIENT)
Dept: WOMENS IMAGING | Facility: HOSPITAL | Age: 47
End: 2022-07-18

## 2022-07-18 PROCEDURE — 77067 SCR MAMMO BI INCL CAD: CPT | Performed by: RADIOLOGY

## 2022-07-18 PROCEDURE — 77063 BREAST TOMOSYNTHESIS BI: CPT | Performed by: RADIOLOGY

## 2022-11-10 ENCOUNTER — OFFICE VISIT (OUTPATIENT)
Dept: ORTHOPEDIC SURGERY | Facility: CLINIC | Age: 47
End: 2022-11-10

## 2022-11-10 VITALS — WEIGHT: 143 LBS | HEIGHT: 70 IN | BODY MASS INDEX: 20.47 KG/M2

## 2022-11-10 DIAGNOSIS — M17.12 PRIMARY OSTEOARTHRITIS OF LEFT KNEE: Primary | ICD-10-CM

## 2022-11-10 DIAGNOSIS — M23.204 OLD COMPLEX TEAR OF MEDIAL MENISCUS OF LEFT KNEE: ICD-10-CM

## 2022-11-10 PROCEDURE — 99213 OFFICE O/P EST LOW 20 MIN: CPT | Performed by: ORTHOPAEDIC SURGERY

## 2022-11-10 PROCEDURE — 73562 X-RAY EXAM OF KNEE 3: CPT | Performed by: ORTHOPAEDIC SURGERY

## 2022-11-10 NOTE — PROGRESS NOTES
"Subjective:     Patient ID: Colette Moreno is a 47 y.o. female.    Chief Complaint:  Left knee pain, new exacerbation    History of Present Illness  Colette Moreno returns to clinic today for evaluation of  left knee pain. She states that over the last 3 to 4 months, she has had increasing levels of pain primarily to the medial but also now to the anterior aspect of her knee, worse with deep flexion activities and lunges. She does note some moderate swelling that waxes and wanes. She denies any numbness or tingling. She rates pain as a 6 to 7 out of 10, aching in nature. She also notes pain on long drives.     Social History     Occupational History   • Not on file   Tobacco Use   • Smoking status: Never   • Smokeless tobacco: Never   Vaping Use   • Vaping Use: Never used   Substance and Sexual Activity   • Alcohol use: Yes   • Drug use: Never   • Sexual activity: Defer      Past Medical History:   Diagnosis Date   • Cardiac arrhythmia due to congenital heart disease    • Fracture of wrist     R   • Insomnia    • Tear of meniscus of knee      Past Surgical History:   Procedure Laterality Date   • CHOLECYSTECTOMY  12/2016   • KNEE SURGERY Left 12/2007       Family History   Problem Relation Age of Onset   • Cancer Maternal Grandfather    • Hypertension Father    • Hypertension Brother    • Hypertension Paternal Aunt    • Heart disease Paternal Aunt    • Hypertension Paternal Uncle    • Heart disease Paternal Uncle    • Hypertension Paternal Grandmother    • Heart disease Paternal Grandmother    • Hypertension Paternal Grandfather    • Heart disease Paternal Grandfather          Review of Systems        Objective:  Vitals:    11/10/22 1002   Weight: 64.9 kg (143 lb)   Height: 177.8 cm (70\")         11/10/22  1002   Weight: 64.9 kg (143 lb)     Body mass index is 20.52 kg/m².  Physical Exam    Vital signs reviewed.   General: No acute distress, alert and oriented  Eyes: conjunctiva clear; pupils equally round " and reactive  ENT: external ears and nose atraumatic; oropharynx clear  CV: no peripheral edema  Resp: normal respiratory effort  Skin: no rashes or wounds; normal turgor  Psych: mood and affect appropriate; recent and remote memory intact          Ortho Exam       Left Knee-    Active Range of Motion- 0 to 140 degrees  5/5 on flexion  5/5 on extension  Maximal tenderness is along medial joint line with mild crepitus.  Mild tenderness along medial and lateral patellar facet.  Effusion- Mild to moderate  Grade 1A Lachman  Anterior drawer- Negative  Posterior drawer- Negative  Stable opening on varus and valgus stress at 0 and 30  Active patellar compression test- Mildly positive  Log roll- No hip pain  Stinchfield- No hip pain  Positive sensation light touch all distributions symmetric to contralateral side  Brisk cap refill all digits  2+ dorsalis pedis pulse    Imaging:  Left Knee X-Ray  Indication: Pain    AP, Lateral, and Staley views    Findings:  No fracture  No bony lesion  Normal soft tissues  Moderate medial compartment joint space narrowing with slight varus alignment, mild reactive osteophyte formation along medial proximal tibia  Compared to prior X-rays    Assessment:        1. Primary osteoarthritis of left knee    2. Old complex tear of medial meniscus of left knee           Plan:        1. Discussed treatment options at length with patient at today's visit.    2. The patient has noted moderate exacerbation of pain over the last 3 to 4 months. She had very good response with viscosupplement injection. We discussed options for repeat injections versus surgical treatment with partial versus total arthroplasty. At this point in time, she wants to continue with injections.    3. Request ordered for left knee viscosupplement injection.    4. Follow up for visco injection once approved.      Colette Moreno was in agreement with plan and had all questions answered.     Orders:  Orders Placed This  Encounter   Procedures   • XR Knee 3 View Left   • Visco Treatment       Medications:  No orders of the defined types were placed in this encounter.      Followup:  Return for viscosupplement injections.    Diagnoses and all orders for this visit:    1. Primary osteoarthritis of left knee (Primary)  -     XR Knee 3 View Left  -     Visco Treatment; Future    2. Old complex tear of medial meniscus of left knee  -     XR Knee 3 View Left  -     Visco Treatment; Future    Transcribed from ambient dictation for Jung Arthur MD by Marlyn Clements.  11/10/22   11:05 EST    Patient or patient representative verbalized consent to the visit recording.  I have personally performed the services described in this document as transcribed by the above individual, and it is both accurate and complete.

## 2023-01-19 ENCOUNTER — TELEPHONE (OUTPATIENT)
Dept: ORTHOPEDIC SURGERY | Facility: CLINIC | Age: 48
End: 2023-01-19

## 2023-01-19 NOTE — TELEPHONE ENCOUNTER
Caller: KEVIN BROWER    Relationship to patient: PATIENT     Best call back number: [phone]    Chief complaint: LEFT KNEE     Type of visit:**INJ LT KNEE D[facility]OL[facility]    Requested date: ANY DAY EXCEPT 02/06/23, LATE IN THE AFTERNOON PLEASE     If rescheduling, when is the original appointment: 02/06/23

## 2023-02-13 ENCOUNTER — CLINICAL SUPPORT (OUTPATIENT)
Dept: ORTHOPEDIC SURGERY | Facility: CLINIC | Age: 48
End: 2023-02-13
Payer: COMMERCIAL

## 2023-02-13 VITALS — HEIGHT: 70 IN | BODY MASS INDEX: 20.47 KG/M2 | WEIGHT: 143 LBS

## 2023-02-13 DIAGNOSIS — M17.12 PRIMARY OSTEOARTHRITIS OF LEFT KNEE: Primary | ICD-10-CM

## 2023-02-13 PROCEDURE — 20610 DRAIN/INJ JOINT/BURSA W/O US: CPT | Performed by: ORTHOPAEDIC SURGERY

## 2023-02-13 NOTE — PROGRESS NOTES
Large Joint Arthrocentesis: L knee  Date/Time: 2/13/2023 3:08 PM  Consent given by: patient  Site marked: site marked  Timeout: Immediately prior to procedure a time out was called to verify the correct patient, procedure, equipment, support staff and site/side marked as required   Supporting Documentation  Indications: pain   Procedure Details  Location: knee - L knee  Preparation: Patient was prepped and draped in the usual sterile fashion  Needle gauge: 21G.  Approach: anterolateral  Medications administered: 60 mg Sodium Hyaluronate 60 MG/3ML  Patient tolerance: patient tolerated the procedure well with no immediate complications        Patient presents to clinic today for left knee viscosupplement one shot injection. I explained details of injections as well as risks, benefits and alternatives with the patient today, had all questions answered, wished to proceed with injection.  I will see patient back in 6 weeks for follow up on injection. Patient was instructed to watch for signs or symptoms of infection including redness, swelling, warmth to the touch, or significant increased pain and to contact our office immediately if any of these issues were noted.

## 2024-08-22 ENCOUNTER — TELEPHONE (OUTPATIENT)
Dept: ORTHOPEDIC SURGERY | Facility: CLINIC | Age: 49
End: 2024-08-22

## 2024-09-17 ENCOUNTER — APPOINTMENT (OUTPATIENT)
Dept: WOMENS IMAGING | Facility: HOSPITAL | Age: 49
End: 2024-09-17
Payer: COMMERCIAL

## 2024-09-17 PROCEDURE — 77061 BREAST TOMOSYNTHESIS UNI: CPT | Performed by: RADIOLOGY

## 2024-09-17 PROCEDURE — 77065 DX MAMMO INCL CAD UNI: CPT | Performed by: RADIOLOGY

## 2024-09-17 PROCEDURE — G0279 TOMOSYNTHESIS, MAMMO: HCPCS | Performed by: RADIOLOGY

## 2024-09-17 PROCEDURE — 76642 ULTRASOUND BREAST LIMITED: CPT | Performed by: RADIOLOGY

## 2024-09-18 ENCOUNTER — OFFICE VISIT (OUTPATIENT)
Dept: ORTHOPEDIC SURGERY | Facility: CLINIC | Age: 49
End: 2024-09-18
Payer: COMMERCIAL

## 2024-09-18 VITALS — BODY MASS INDEX: 21.42 KG/M2 | WEIGHT: 149.6 LBS | HEIGHT: 70 IN

## 2024-09-18 DIAGNOSIS — M17.12 PRIMARY OSTEOARTHRITIS OF LEFT KNEE: Primary | ICD-10-CM

## 2024-09-18 PROCEDURE — 99213 OFFICE O/P EST LOW 20 MIN: CPT | Performed by: ORTHOPAEDIC SURGERY

## 2024-09-18 RX ORDER — HYDROXYZINE HYDROCHLORIDE 25 MG/1
TABLET, FILM COATED ORAL
COMMUNITY
Start: 2024-08-23

## 2024-10-09 ENCOUNTER — CLINICAL SUPPORT (OUTPATIENT)
Dept: ORTHOPEDIC SURGERY | Facility: CLINIC | Age: 49
End: 2024-10-09
Payer: COMMERCIAL

## 2024-10-09 VITALS — HEIGHT: 70 IN | WEIGHT: 149 LBS | BODY MASS INDEX: 21.33 KG/M2

## 2024-10-09 DIAGNOSIS — M17.12 PRIMARY OSTEOARTHRITIS OF LEFT KNEE: Primary | ICD-10-CM

## 2024-10-09 RX ORDER — HYALURONATE SODIUM, STABILIZED 60 MG/3 ML
SYRINGE (ML) INTRAARTICULAR
COMMUNITY
Start: 2024-09-26

## 2024-10-09 NOTE — PROGRESS NOTES
Large Joint Arthrocentesis: L knee  Date/Time: 10/9/2024 1:30 PM  Consent given by: patient  Site marked: site marked  Timeout: Immediately prior to procedure a time out was called to verify the correct patient, procedure, equipment, support staff and site/side marked as required   Supporting Documentation  Indications: pain   Procedure Details  Location: knee - L knee  Preparation: Patient was prepped and draped in the usual sterile fashion  Needle gauge: 21G.  Medications administered: 60 mg Sodium Hyaluronate 60 MG/3ML  Patient tolerance: patient tolerated the procedure well with no immediate complications      Patient presents to clinic today for left knee viscosupplement one shot injection. I explained details of injections as well as risks, benefits and alternatives with the patient today, had all questions answered, wished to proceed with injection.  I will see patient back in 6 weeks for follow up on injection. Patient was instructed to watch for signs or symptoms of infection including redness, swelling, warmth to the touch, or significant increased pain and to contact our office immediately if any of these issues were noted.